# Patient Record
Sex: FEMALE | Race: WHITE | URBAN - METROPOLITAN AREA
[De-identification: names, ages, dates, MRNs, and addresses within clinical notes are randomized per-mention and may not be internally consistent; named-entity substitution may affect disease eponyms.]

---

## 2018-06-29 ENCOUNTER — OFFICE VISIT (OUTPATIENT)
Dept: URBAN - METROPOLITAN AREA CLINIC 23 | Facility: CLINIC | Age: 78
End: 2018-06-29
Payer: COMMERCIAL

## 2018-06-29 PROCEDURE — 99213 OFFICE O/P EST LOW 20 MIN: CPT | Performed by: OPHTHALMOLOGY

## 2018-06-29 PROCEDURE — 92134 CPTRZ OPH DX IMG PST SGM RTA: CPT | Performed by: OPHTHALMOLOGY

## 2018-06-29 ASSESSMENT — INTRAOCULAR PRESSURE
OS: 17
OD: 14

## 2018-06-29 NOTE — IMPRESSION/PLAN
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 OU. Condition: established, stable. Vision: vision affected. Hx of injections, last AV OD 09/04/2015, last AV OS 08/05/2016. Plan: Discussed diagnosis in detail with patient. No treatment is required at this time based on exam and OCT. Recommend observation for now. Will reassess condition in 10 wks. OCT OD is stable with no IRF or SRF and, OS shows stable Disciform Scar.  Recommend a refraction with Optometrist.

## 2018-08-02 ENCOUNTER — OFFICE VISIT (OUTPATIENT)
Dept: URBAN - METROPOLITAN AREA CLINIC 29 | Facility: CLINIC | Age: 78
End: 2018-08-02
Payer: COMMERCIAL

## 2018-08-02 DIAGNOSIS — H52.03 HYPERMETROPIA, BILATERAL: Primary | ICD-10-CM

## 2018-08-02 DIAGNOSIS — H35.443 BILATERAL AGE-RELATED RETICULAR DEGENERATION OF RETINAS: ICD-10-CM

## 2018-08-02 PROCEDURE — 92015 DETERMINE REFRACTIVE STATE: CPT | Performed by: OPTOMETRIST

## 2018-08-02 PROCEDURE — 92012 INTRM OPH EXAM EST PATIENT: CPT | Performed by: OPTOMETRIST

## 2018-08-02 ASSESSMENT — VISUAL ACUITY
OS: CF
OD: 20/25

## 2018-08-02 ASSESSMENT — INTRAOCULAR PRESSURE
OD: 18
OS: 19

## 2018-08-02 NOTE — IMPRESSION/PLAN
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 OU. Plan: Discussed diagnosis in detail with patient. Discussed treatment options with patient. Use of vitamins has shown to improve the effects of ARMD. Will continue to observe condition and or symptoms.

## 2018-09-07 ENCOUNTER — OFFICE VISIT (OUTPATIENT)
Dept: URBAN - METROPOLITAN AREA CLINIC 23 | Facility: CLINIC | Age: 78
End: 2018-09-07
Payer: COMMERCIAL

## 2018-09-07 DIAGNOSIS — H35.3233 EXUDATIVE AGE-REL MCLR DEGN, BILATERAL, WITH INACTIVE SCAR: Primary | ICD-10-CM

## 2018-09-07 PROCEDURE — 92134 CPTRZ OPH DX IMG PST SGM RTA: CPT | Performed by: OPHTHALMOLOGY

## 2018-09-07 PROCEDURE — 99213 OFFICE O/P EST LOW 20 MIN: CPT | Performed by: OPHTHALMOLOGY

## 2018-09-07 ASSESSMENT — INTRAOCULAR PRESSURE
OS: 13
OD: 13

## 2018-09-07 NOTE — IMPRESSION/PLAN
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 OU. Condition: established, stable. Vision: vision affected. Hx of injections, last AV OD 09/04/2015, last AV OS 08/05/2016. Plan: Discussed diagnosis in detail with patient. No treatment is required at this time based on exam and OCT. Recommend observation for now. Continue with AREDS 2 vitamins. Will reassess condition in 4 months. OCT OD is stable with no IRF or SRF and, OS shows stable Disciform Scar.

## 2019-01-03 ENCOUNTER — OFFICE VISIT (OUTPATIENT)
Dept: URBAN - METROPOLITAN AREA CLINIC 23 | Facility: CLINIC | Age: 79
End: 2019-01-03
Payer: MEDICARE

## 2019-01-03 DIAGNOSIS — H35.3211 EXDTVE AGE-REL MCLR DEGN, RIGHT EYE, WITH ACTV CHRDL NEOVAS: Primary | ICD-10-CM

## 2019-01-03 PROCEDURE — 99213 OFFICE O/P EST LOW 20 MIN: CPT | Performed by: OPHTHALMOLOGY

## 2019-01-03 PROCEDURE — 92134 CPTRZ OPH DX IMG PST SGM RTA: CPT | Performed by: OPHTHALMOLOGY

## 2019-01-03 ASSESSMENT — INTRAOCULAR PRESSURE
OD: 15
OS: 15

## 2019-01-03 NOTE — IMPRESSION/PLAN
Impression: Exdtve age-rel mclr degn, right eye, with actv chrdl neovas: H35.3211. OD. Condition: unstable. Vision: vision affected. last AV OD 09/04/2015 Plan: Discussed diagnosis in detail with patient. Discussed risks of progression with present condition. Based on findings recommend Intravitreal Injection Treatment to help reduce the fluid and prevent a further reduction in vision. Discussed the risks and benefits of tx. All questions answered. Patient elects to proceed with recommendation. OCT shows active fluid with scarring OD.

## 2019-01-03 NOTE — IMPRESSION/PLAN
Impression: Exudative age-rel mclr degn, left eye, with inactive scar: H00.2841. OS. Condition: stable. Vision: vision affected. Last AV OS 08/05/2016 Plan: Discussed diagnosis in detail with patient. No treatment is required at this time based on exam and OCT. Recommend observation for now. Will reassess condition in 6 wks. OCT shows stable Disciform Scar with no IRF or SRF OS.

## 2019-01-04 ENCOUNTER — PROCEDURE (OUTPATIENT)
Dept: URBAN - METROPOLITAN AREA CLINIC 23 | Facility: CLINIC | Age: 79
End: 2019-01-04
Payer: MEDICARE

## 2019-01-04 PROCEDURE — 67028 INJECTION EYE DRUG: CPT | Performed by: OPHTHALMOLOGY

## 2019-03-01 ENCOUNTER — OFFICE VISIT (OUTPATIENT)
Dept: URBAN - METROPOLITAN AREA CLINIC 23 | Facility: CLINIC | Age: 79
End: 2019-03-01
Payer: MEDICARE

## 2019-03-01 DIAGNOSIS — H25.13 AGE-RELATED NUCLEAR CATARACT, BILATERAL: ICD-10-CM

## 2019-03-01 PROCEDURE — 92134 CPTRZ OPH DX IMG PST SGM RTA: CPT | Performed by: OPHTHALMOLOGY

## 2019-03-01 PROCEDURE — 67028 INJECTION EYE DRUG: CPT | Performed by: OPHTHALMOLOGY

## 2019-03-01 PROCEDURE — 99213 OFFICE O/P EST LOW 20 MIN: CPT | Performed by: OPHTHALMOLOGY

## 2019-03-01 ASSESSMENT — INTRAOCULAR PRESSURE
OD: 16
OS: 16

## 2019-03-01 NOTE — IMPRESSION/PLAN
Impression: Exudative age-rel mclr degn, left eye, with inactive scar: I48.0411. OS. Condition: stable. Vision: vision affected. Last AV OS 08/05/2016 Plan: Discussed diagnosis in detail with patient. No treatment is required at this time based on exam and OCT. Recommend observation for now. Will reassess condition in 6 wks. OCT shows stable Disciform Scar with no IRF or SRF OS.

## 2019-03-01 NOTE — IMPRESSION/PLAN
Impression: Exdtve age-rel mclr degn, right eye, with actv chrdl neovas: H35.3211. OD. Condition: unstable but improving. Vision: vision affected. last AV OD 01/04/2019,  09/04/2015 Plan: Discussed diagnosis in detail with patient. Discussed risks of progression with present condition. Based on findings recommend to continue with Intravitreal Injection Treatment to help reduce the fluid and prevent a further reduction in vision. Discussed the risks and benefits of tx. All questions answered. An examination that was significantly and separately identifiable from the procedure was performed today. Patient elects to proceed with recommendation. OCT shows a decrease in fluid OD.

## 2019-04-12 ENCOUNTER — OFFICE VISIT (OUTPATIENT)
Dept: URBAN - METROPOLITAN AREA CLINIC 23 | Facility: CLINIC | Age: 79
End: 2019-04-12
Payer: MEDICARE

## 2019-04-12 PROCEDURE — 99213 OFFICE O/P EST LOW 20 MIN: CPT | Performed by: OPHTHALMOLOGY

## 2019-04-12 PROCEDURE — 92134 CPTRZ OPH DX IMG PST SGM RTA: CPT | Performed by: OPHTHALMOLOGY

## 2019-04-12 ASSESSMENT — INTRAOCULAR PRESSURE
OS: 18
OD: 19

## 2019-04-12 NOTE — IMPRESSION/PLAN
Impression: Exudative age-rel mclr degn, left eye, with inactive scar: C95.9919. OS. Condition: stable. Vision: vision affected. Last AV OS 08/05/2016 Plan: Discussed diagnosis in detail with patient. No treatment is required at this time based on exam and OCT. Recommend observation for now. Will reassess condition in 6 wks. OCT shows stable Disciform Scar with no IRF or SRF OS.

## 2019-04-12 NOTE — IMPRESSION/PLAN
Impression: Exdtve age-rel mclr degn, right eye, with actv chrdl neovas: H35.3211. OD. Condition:  improving. Vision: vision affected. last AV OD 03/01/2019. Plan:  Discussed diagnosis in detail with patient. No treatment is required at this time based on exam and OCT. Recommend observation for now. Will reassess condition in 6 wks. OCT shows a decrease in fluid OD.

## 2019-05-08 ENCOUNTER — OFFICE VISIT (OUTPATIENT)
Dept: URBAN - METROPOLITAN AREA CLINIC 23 | Facility: CLINIC | Age: 79
End: 2019-05-08
Payer: MEDICARE

## 2019-05-08 PROCEDURE — 92134 CPTRZ OPH DX IMG PST SGM RTA: CPT | Performed by: OPTOMETRIST

## 2019-05-08 PROCEDURE — 99213 OFFICE O/P EST LOW 20 MIN: CPT | Performed by: OPTOMETRIST

## 2019-05-08 ASSESSMENT — KERATOMETRY
OS: 44.13
OD: 43.75

## 2019-05-08 ASSESSMENT — INTRAOCULAR PRESSURE
OS: 18
OD: 20

## 2019-05-08 NOTE — IMPRESSION/PLAN
Impression: Exudative age-rel mclr degn, left eye, with inactive scar: H35.3223 OS. Plan: Stable. Continue to monitor with Dr. Jimy Mendoza.

## 2019-05-08 NOTE — IMPRESSION/PLAN
Impression: Exdtve age-rel mclr degn, right eye, with actv chrdl neovas: H35.3211. Plan: Discussed diagnosis in detail with patient. Discussed treatment options with patient. Reassured patient of current condition and treatment. Will continue to observe condition and or symptoms. Recommended f/u with Dr. Scott Ann in 2 days for injection OD.

## 2019-05-10 ENCOUNTER — OFFICE VISIT (OUTPATIENT)
Dept: URBAN - METROPOLITAN AREA CLINIC 23 | Facility: CLINIC | Age: 79
End: 2019-05-10
Payer: MEDICARE

## 2019-05-10 PROCEDURE — 67028 INJECTION EYE DRUG: CPT | Performed by: OPHTHALMOLOGY

## 2019-05-10 PROCEDURE — 99213 OFFICE O/P EST LOW 20 MIN: CPT | Performed by: OPHTHALMOLOGY

## 2019-05-10 PROCEDURE — 92134 CPTRZ OPH DX IMG PST SGM RTA: CPT | Performed by: OPHTHALMOLOGY

## 2019-05-10 ASSESSMENT — INTRAOCULAR PRESSURE
OS: 15
OD: 12

## 2019-05-10 NOTE — IMPRESSION/PLAN
Impression: Exudative age-rel mclr degn, left eye, with inactive scar: R15.8789. OS. Condition: stable. Vision: vision affected. Last AV OS 08/05/2016 Plan: Discussed diagnosis in detail with patient. No treatment is required at this time based on exam and OCT. Recommend observation for now. Will reassess condition in 4 -  6 wks. OCT shows stable Disciform Scar with no IRF or SRF OS.

## 2019-05-10 NOTE — IMPRESSION/PLAN
Impression: Exdtve age-rel mclr degn, right eye, with actv chrdl neovas: H35.3211. OD. Condition:  unstable. Vision: vision affected. last AV OD 03/01/2019. Plan: Discussed diagnosis in detail with patient. Discussed risks of progression. Based on today's exam, diagnostic studies and review of records, recommend to continue with YOAV tx to help reduce the fluid in order to prevent a further reduction in vision. An examination that was significantly and separately identifiable from the procedure was performed today. Discussed the risks and benefits of tx. Patient elects to proceed with recommendation. OCT shows active fluid OD.

## 2019-07-02 ENCOUNTER — OFFICE VISIT (OUTPATIENT)
Dept: URBAN - METROPOLITAN AREA CLINIC 23 | Facility: CLINIC | Age: 79
End: 2019-07-02
Payer: MEDICARE

## 2019-07-02 PROCEDURE — 92134 CPTRZ OPH DX IMG PST SGM RTA: CPT | Performed by: OPHTHALMOLOGY

## 2019-07-02 PROCEDURE — 92014 COMPRE OPH EXAM EST PT 1/>: CPT | Performed by: OPHTHALMOLOGY

## 2019-07-02 ASSESSMENT — INTRAOCULAR PRESSURE
OD: 12
OS: 11

## 2019-07-02 NOTE — IMPRESSION/PLAN
Impression: Exdtve age-rel mclr degn, right eye, with actv chrdl neovas: H35.3211. OD. Condition:  slightly improved. Vision: vision affected. last AV OD 5/10/2019, 03/01/2019. Plan: Discussed diagnosis in detail with patient. Discussed risks of progression. Based on today's exam, diagnostic studies and review of records, recommend to continue with YOAV tx RIGHT EYE ONLY to help reduce the fluid in order to prevent a further reduction in vision. Discussed the risks and benefits of tx. Patient elects to proceed with recommendation.  OCT shows slightly improved decreased fluid OD

## 2019-07-02 NOTE — IMPRESSION/PLAN
Impression: Exudative age-rel mclr degn, left eye, with inactive scar: V87.0028. OS. Condition: stable. Vision: vision affected. Last AV OS 08/05/2016 Plan: Discussed diagnosis in detail with patient. No treatment is required at this time based on exam and OCT. Recommend observation for now. Will reassess condition in 4 -  6 wks. OCT shows stable Disciform Scar with no IRF or SRF OS.

## 2019-07-02 NOTE — IMPRESSION/PLAN
Impression: Age-related nuclear cataract, bilateral: H25.13. OU. Condition: worsening. Vision: vision affected. Plan: Advised patient of condition. Discussed diagnosis in detail with patient.  Consult recommended with Cataract specialist

## 2019-07-03 ENCOUNTER — PROCEDURE (OUTPATIENT)
Dept: URBAN - METROPOLITAN AREA CLINIC 33 | Facility: CLINIC | Age: 79
End: 2019-07-03
Payer: MEDICARE

## 2019-07-03 PROCEDURE — 67028 INJECTION EYE DRUG: CPT | Performed by: OPHTHALMOLOGY

## 2019-07-17 ENCOUNTER — OFFICE VISIT (OUTPATIENT)
Dept: URBAN - METROPOLITAN AREA CLINIC 23 | Facility: CLINIC | Age: 79
End: 2019-07-17
Payer: MEDICARE

## 2019-07-17 DIAGNOSIS — H25.813 COMBINED FORMS OF AGE-RELATED CATARACT, BILATERAL: Primary | ICD-10-CM

## 2019-07-17 PROCEDURE — 99214 OFFICE O/P EST MOD 30 MIN: CPT | Performed by: OPHTHALMOLOGY

## 2019-07-17 RX ORDER — DUREZOL 0.5 MG/ML
0.05 % EMULSION OPHTHALMIC
Qty: 2.5 | Refills: 1 | Status: INACTIVE
Start: 2019-07-17 | End: 2019-11-08

## 2019-07-17 RX ORDER — OFLOXACIN 3 MG/ML
0.3 % SOLUTION/ DROPS OPHTHALMIC
Qty: 5 | Refills: 1 | Status: INACTIVE
Start: 2019-07-17 | End: 2019-11-08

## 2019-07-17 ASSESSMENT — KERATOMETRY
OD: 43.88
OS: 44.00

## 2019-07-17 ASSESSMENT — VISUAL ACUITY
OS: CF 1FT
OD: 20/40

## 2019-07-17 NOTE — IMPRESSION/PLAN
Impression: Exudative age-rel mclr degn, left eye, with inactive scar: Y43.9876. OS. Condition: stable. Vision: vision affected. 
Last AV OS 08/05/2016 Plan: F/U with Dr Jermaine Fields as scheduled

## 2019-08-02 ENCOUNTER — OFFICE VISIT (OUTPATIENT)
Dept: URBAN - METROPOLITAN AREA CLINIC 23 | Facility: CLINIC | Age: 79
End: 2019-08-02
Payer: MEDICARE

## 2019-08-02 PROCEDURE — 92134 CPTRZ OPH DX IMG PST SGM RTA: CPT | Performed by: OPHTHALMOLOGY

## 2019-08-02 PROCEDURE — 99213 OFFICE O/P EST LOW 20 MIN: CPT | Performed by: OPHTHALMOLOGY

## 2019-08-02 ASSESSMENT — INTRAOCULAR PRESSURE
OS: 12
OD: 12

## 2019-08-02 NOTE — IMPRESSION/PLAN
Impression: Exudative age-rel mclr degn, left eye, with inactive scar: B95.4788. OS. Condition: stable. Vision: vision affected. Last AV OS 08/05/2016 Plan: Discussed diagnosis in detail with patient. No treatment is required at this time based on exam and OCT. Recommend observation for now. Will reassess condition in 4 -  6 wks. OCT shows stable Disciform Scar with no IRF or SRF OS.

## 2019-08-02 NOTE — IMPRESSION/PLAN
Impression: Exdtve age-rel mclr degn, right eye, with actv chrdl neovas: H35.3211. OD. Condition:  stable. Vision: vision affected. last AV OD 07/03/2019, 5/10/2019, 03/01/2019. Plan: Discussed diagnosis in detail with patient. No treatment is required at this time based on exam and OCT. Recommend close observation for now. Will reassess condition in 4 - 6 wks. OCT shows no IRF or SRF - stable OD.

## 2019-08-12 ENCOUNTER — PRE-OPERATIVE VISIT (OUTPATIENT)
Dept: URBAN - METROPOLITAN AREA CLINIC 23 | Facility: CLINIC | Age: 79
End: 2019-08-12
Payer: MEDICARE

## 2019-08-12 PROCEDURE — 92136 OPHTHALMIC BIOMETRY: CPT | Performed by: OPHTHALMOLOGY

## 2019-08-12 ASSESSMENT — PACHYMETRY
OS: 2.83
OD: 2.97
OD: 22.24
OS: 21.91

## 2019-09-05 ENCOUNTER — SURGERY (OUTPATIENT)
Dept: URBAN - METROPOLITAN AREA SURGERY 11 | Facility: SURGERY | Age: 79
End: 2019-09-05
Payer: MEDICARE

## 2019-09-05 PROCEDURE — 66984 XCAPSL CTRC RMVL W/O ECP: CPT | Performed by: OPHTHALMOLOGY

## 2019-09-06 ENCOUNTER — POST-OPERATIVE VISIT (OUTPATIENT)
Dept: URBAN - METROPOLITAN AREA CLINIC 23 | Facility: CLINIC | Age: 79
End: 2019-09-06

## 2019-09-06 PROCEDURE — 99024 POSTOP FOLLOW-UP VISIT: CPT | Performed by: OPTOMETRIST

## 2019-09-06 ASSESSMENT — INTRAOCULAR PRESSURE
OD: 18
OS: 12

## 2019-09-12 ENCOUNTER — POST-OPERATIVE VISIT (OUTPATIENT)
Dept: URBAN - METROPOLITAN AREA CLINIC 23 | Facility: CLINIC | Age: 79
End: 2019-09-12

## 2019-09-12 DIAGNOSIS — Z09 ENCNTR FOR F/U EXAM AFT TRTMT FOR COND OTH THAN MALIG NEOPLM: Primary | ICD-10-CM

## 2019-09-12 PROCEDURE — 99024 POSTOP FOLLOW-UP VISIT: CPT | Performed by: OPTOMETRIST

## 2019-09-12 ASSESSMENT — VISUAL ACUITY: OD: 20/40+

## 2019-09-12 ASSESSMENT — INTRAOCULAR PRESSURE: OD: 17

## 2019-09-30 ENCOUNTER — OFFICE VISIT (OUTPATIENT)
Dept: URBAN - METROPOLITAN AREA CLINIC 23 | Facility: CLINIC | Age: 79
End: 2019-09-30
Payer: MEDICARE

## 2019-09-30 PROCEDURE — 99213 OFFICE O/P EST LOW 20 MIN: CPT | Performed by: OPHTHALMOLOGY

## 2019-09-30 PROCEDURE — 92134 CPTRZ OPH DX IMG PST SGM RTA: CPT | Performed by: OPHTHALMOLOGY

## 2019-09-30 ASSESSMENT — INTRAOCULAR PRESSURE
OS: 14
OD: 14

## 2019-09-30 NOTE — IMPRESSION/PLAN
Impression: Exdtve age-rel mclr degn, right eye, with actv chrdl neovas: H35.3211. OD. Condition:  unstable. Vision: vision affected. last AV OD 07/03/2019, 5/10/2019, 03/01/2019. Plan: Discussed diagnosis in detail with patient. Discussed risks of progression with present condition. Based on findings recommend Intravitreal Injection Treatment to help reduce the fluid and prevent a further reduction in vision. Discussed the risks and benefits of tx. All questions answered. Patient elects to proceed with recommendation. OCT shows increased fluid inferior to fovea center OD Discussed with patient would not recommend updating glasses rx until retina stable

## 2019-09-30 NOTE — IMPRESSION/PLAN
Impression: Exudative age-rel mclr degn, left eye, with inactive scar: E97.0320. OS. Condition: stable. Vision: vision affected. Last AV OS 08/05/2016 Plan: Discussed diagnosis in detail with patient. No treatment is required at this time based on exam and OCT. Recommend observation for now. Will reassess condition in 4 -  6 wks. OCT shows stable Disciform Scar with no IRF or SRF OS.

## 2019-10-11 ENCOUNTER — PROCEDURE (OUTPATIENT)
Dept: URBAN - METROPOLITAN AREA CLINIC 23 | Facility: CLINIC | Age: 79
End: 2019-10-11
Payer: MEDICARE

## 2019-10-11 PROCEDURE — 67028 INJECTION EYE DRUG: CPT | Performed by: OPHTHALMOLOGY

## 2019-11-08 ENCOUNTER — OFFICE VISIT (OUTPATIENT)
Dept: URBAN - METROPOLITAN AREA CLINIC 23 | Facility: CLINIC | Age: 79
End: 2019-11-08
Payer: MEDICARE

## 2019-11-08 PROCEDURE — 67028 INJECTION EYE DRUG: CPT | Performed by: OPHTHALMOLOGY

## 2019-11-08 PROCEDURE — 92134 CPTRZ OPH DX IMG PST SGM RTA: CPT | Performed by: OPHTHALMOLOGY

## 2019-11-08 PROCEDURE — 99213 OFFICE O/P EST LOW 20 MIN: CPT | Performed by: OPHTHALMOLOGY

## 2019-11-08 ASSESSMENT — INTRAOCULAR PRESSURE
OD: 13
OS: 14

## 2019-11-08 NOTE — IMPRESSION/PLAN
Impression: Age-related nuclear cataract, left eye: H25.12. OS. Condition: stable. Vision: vision affected. Plan: Discussed diagnosis in detail with patient. No treatment is required at this time. Continue eye care with Dr Elizabeth Martinez.

## 2019-11-08 NOTE — IMPRESSION/PLAN
Impression: Exdtve age-rel mclr degn, right eye, with actv chrdl neovas: H35.3211. OD. Condition:  improving. Vision: vision affected. last AV OD 10/11/2019, AV OD 07/03/2019, 5/10/2019, 03/01/2019. Plan: Discussed diagnosis in detail with patient. Discussed risks of progression with present condition. Based on findings recommend to continue with Intravitreal Injection Treatment to further reduce the fluid and prevent a further reduction in vision. Discussed the risks and benefits of tx. All questions answered. Patient elects to proceed with recommendation. OCT shows decrease in CMT - decrease in fluid OD.

## 2019-11-08 NOTE — IMPRESSION/PLAN
Impression: Exudative age-rel mclr degn, left eye, with inactive scar: R6.2633. OS. Condition: stable. Vision: vision affected. Last AV OS 08/05/2016 Plan: Discussed diagnosis in detail with patient. No treatment is required at this time based on exam and OCT. Recommend observation for now. Will reassess condition in 4 -  6 wks. OCT shows stable Disciform Scar with no IRF or SRF OS.

## 2019-12-06 ENCOUNTER — OFFICE VISIT (OUTPATIENT)
Dept: URBAN - METROPOLITAN AREA CLINIC 23 | Facility: CLINIC | Age: 79
End: 2019-12-06
Payer: MEDICARE

## 2019-12-06 PROCEDURE — 67028 INJECTION EYE DRUG: CPT | Performed by: OPHTHALMOLOGY

## 2019-12-06 PROCEDURE — 92134 CPTRZ OPH DX IMG PST SGM RTA: CPT | Performed by: OPHTHALMOLOGY

## 2019-12-06 PROCEDURE — 99213 OFFICE O/P EST LOW 20 MIN: CPT | Performed by: OPHTHALMOLOGY

## 2019-12-06 ASSESSMENT — INTRAOCULAR PRESSURE
OS: 15
OD: 15

## 2019-12-06 NOTE — IMPRESSION/PLAN
Impression: Age-related nuclear cataract, left eye: H25.12. OS. Condition: stable. Vision: vision affected. Plan: Discussed diagnosis in detail with patient. No treatment is required at this time. Continue eye care with Dr Indy Samuels.

## 2019-12-06 NOTE — IMPRESSION/PLAN
Impression: Exudative age-rel mclr degn, left eye, with inactive scar: N27.7417. OS. Condition: stable. Vision: vision affected. Last AV OS 08/05/2016 Plan: Discussed diagnosis in detail with patient. No treatment is required at this time based on exam and OCT. Recommend observation for now. Will reassess condition in 4 -  6 wks. OCT shows stable Disciform Scar with no IRF or SRF OS.

## 2019-12-06 NOTE — IMPRESSION/PLAN
Impression: Exdtve age-rel mclr degn, right eye, with actv chrdl neovas: H35.3211. OD. Condition:  improving. Vision: vision affected. last AV OD 11/8/2019, AV OD 10/11/2019, AV OD 07/03/2019, 5/10/2019, 03/01/2019. Plan: Discussed diagnosis in detail with patient. Discussed risks of progression with present condition. Based on findings recommend to continue with Intravitreal Injection Treatment to further reduce the fluid and prevent a further reduction in vision. Discussed the risks and benefits of tx. All questions answered. Patient elects to proceed with recommendation. OCT shows decrease in CMT - decrease in fluid OD.

## 2020-01-17 ENCOUNTER — OFFICE VISIT (OUTPATIENT)
Dept: URBAN - METROPOLITAN AREA CLINIC 23 | Facility: CLINIC | Age: 80
End: 2020-01-17
Payer: MEDICARE

## 2020-01-17 PROCEDURE — 67028 INJECTION EYE DRUG: CPT | Performed by: OPHTHALMOLOGY

## 2020-01-17 PROCEDURE — 99213 OFFICE O/P EST LOW 20 MIN: CPT | Performed by: OPHTHALMOLOGY

## 2020-01-17 PROCEDURE — 92134 CPTRZ OPH DX IMG PST SGM RTA: CPT | Performed by: OPHTHALMOLOGY

## 2020-01-17 ASSESSMENT — INTRAOCULAR PRESSURE
OS: 16
OD: 14

## 2020-01-17 NOTE — IMPRESSION/PLAN
Impression: Exdtve age-rel mclr degn, right eye, with actv chrdl neovas: H35.3211. OD. Condition:  improving. Vision: vision improving. last AV OD 12/06/2019, AV OD 11/8/2019, AV OD 10/11/2019, AV OD 07/03/2019, 5/10/2019, 03/01/2019. Plan: Discussed diagnosis in detail with patient. Discussed risks of progression with present condition. Based on findings recommend to continue with Intravitreal Injection Treatment to further reduce the fluid and prevent a further reduction in vision. Discussed the risks and benefits of tx. All questions answered. Patient elects to proceed with recommendation. OCT shows decrease in CMT - decrease in fluid OD.

## 2020-01-17 NOTE — IMPRESSION/PLAN
Impression: Age-related nuclear cataract, left eye: H25.12. OS. Condition: stable. Vision: vision affected. Plan: Discussed diagnosis in detail with patient. No treatment is required at this time. Continue eye care with Dr Lillie Ramos.

## 2020-01-17 NOTE — IMPRESSION/PLAN
Impression: Exudative age-rel mclr degn, left eye, with inactive scar: K53.2044. OS. Condition: stable. Vision: vision affected. Last AV OS 08/05/2016 Plan: Discussed diagnosis in detail with patient. No treatment is required at this time based on exam and OCT. Recommend observation for now. Will reassess condition in 4 -  6 wks. OCT shows stable Disciform Scar with no IRF or SRF OS.

## 2020-02-28 ENCOUNTER — OFFICE VISIT (OUTPATIENT)
Dept: URBAN - METROPOLITAN AREA CLINIC 23 | Facility: CLINIC | Age: 80
End: 2020-02-28
Payer: MEDICARE

## 2020-02-28 DIAGNOSIS — H25.12 AGE-RELATED NUCLEAR CATARACT, LEFT EYE: ICD-10-CM

## 2020-02-28 PROCEDURE — 92134 CPTRZ OPH DX IMG PST SGM RTA: CPT | Performed by: OPHTHALMOLOGY

## 2020-02-28 PROCEDURE — 67028 INJECTION EYE DRUG: CPT | Performed by: OPHTHALMOLOGY

## 2020-02-28 PROCEDURE — 99213 OFFICE O/P EST LOW 20 MIN: CPT | Performed by: OPHTHALMOLOGY

## 2020-02-28 ASSESSMENT — INTRAOCULAR PRESSURE
OD: 15
OS: 15

## 2020-02-28 NOTE — IMPRESSION/PLAN
Impression: Exudative age-rel mclr degn, left eye, with inactive scar: T18.6617. OS. Condition: stable. Vision: vision affected. Last AV OS 08/05/2016 Plan: Discussed diagnosis in detail with patient. No treatment is required at this time based on exam and OCT. Recommend observation for now. Will reassess condition in 4 -  6 wks. OCT shows stable Disciform Scar with no IRF or SRF OS.

## 2020-02-28 NOTE — IMPRESSION/PLAN
Impression: Exdtve age-rel mclr colten, right eye, with actv chrdl neovas: H35.3211. OD. Condition:  improving. Vision: vision improving. last AV OD 01/17/2020, 12/06/2019, AV OD 11/8/2019, AV OD 10/11/2019. .. Plan: Discussed diagnosis in detail with patient. Discussed risks of progression with present condition. Patient states her vision continues to decrease even post YOAV tx, would like to know what can be done to help her vision, why is vision decreasing with YOAV tx. Discussed ARMD in great length and progression of condition. Patient is concerned about loosing her site and is not understanding why her vision continues to worsen. Based on findings recommend to continue with Intravitreal Injection Treatment with AVASTIN and KENALOG  to further reduce the fluid and prevent a further reduction in vision. Discussed the risks and benefits of tx. All questions answered. Patient elects to proceed with recommendation although unhappy with current treatment. OCT shows decrease in CMT - decrease in fluid OD.

## 2020-02-28 NOTE — IMPRESSION/PLAN
Impression: Age-related nuclear cataract, left eye: H25.12. OS. Condition: stable. Vision: vision affected. Plan: Discussed diagnosis in detail with patient. No treatment is required at this time. Continue eye care with Dr Catalino Tafoya.

## 2020-03-06 ENCOUNTER — OFFICE VISIT (OUTPATIENT)
Dept: URBAN - METROPOLITAN AREA CLINIC 23 | Facility: CLINIC | Age: 80
End: 2020-03-06
Payer: MEDICARE

## 2020-03-06 PROCEDURE — 99212 OFFICE O/P EST SF 10 MIN: CPT | Performed by: OPTOMETRIST

## 2020-03-06 ASSESSMENT — INTRAOCULAR PRESSURE
OS: 13
OD: 16

## 2020-03-06 NOTE — IMPRESSION/PLAN
Impression: Exudative age-rel mclr degn, left eye, with inactive scar: Q04.8835. Plan: Discussed findings. Recommended going to [facility] for low vision assistance. Pt. to f/u with Dr. Kike Marroquin as scheduled in April.

## 2020-03-06 NOTE — IMPRESSION/PLAN
Impression: Exudative age-rel mclr degn, left eye, with inactive scar: P14.3536. Plan: Discussed findings. Pt. to f/u with Dr. Alaina Pablo as scheduled in April.

## 2020-03-06 NOTE — IMPRESSION/PLAN
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 Plan: Return in one year with Dr. Josh Kyle for complete examination.

## 2020-06-05 ENCOUNTER — OFFICE VISIT (OUTPATIENT)
Dept: URBAN - METROPOLITAN AREA CLINIC 23 | Facility: CLINIC | Age: 80
End: 2020-06-05
Payer: MEDICARE

## 2020-06-05 PROCEDURE — 99213 OFFICE O/P EST LOW 20 MIN: CPT | Performed by: OPHTHALMOLOGY

## 2020-06-05 PROCEDURE — 67028 INJECTION EYE DRUG: CPT | Performed by: OPHTHALMOLOGY

## 2020-06-05 ASSESSMENT — INTRAOCULAR PRESSURE
OS: 14
OD: 13

## 2020-06-05 NOTE — IMPRESSION/PLAN
Impression: Exudative age-rel mclr degn, left eye, with inactive scar: I31.3192. OS. Condition: stable. Vision: vision affected. Last AV OS 08/05/2016 Plan: Discussed diagnosis in detail with patient. No treatment is required at this time based on exam and OCT. Recommend observation for now. Will reassess condition in 4 -  6 wks. OCT and Optos shows stable Disciform Scar with no IRF or SRF OS. Patient states her vision is decreasing and would like to know if she can see Dr. Wan Mcintosh. Patient can schedule a LVE w/Dr Wan Mcintosh.

## 2020-06-05 NOTE — IMPRESSION/PLAN
Impression: Exdtve age-rel mclr degn, right eye, with actv chrdl neovas: H35.3211. OD. Condition:  unstable. Vision: vision affected
last AV / ALIREZA OD 02/28/2020, AV OD 01/17/2020, 12/06/2019, AV OD 11/8/2019, AV OD 10/11/2019. .. Plan: Discussed diagnosis in detail with patient. Discussed risks of progression. Based on today's exam, diagnostic studies and review of records, recommend to continue with YOAV tx to help reduce the fluid in order to prevent a further reduction in vision. An examination that was significantly and separately identifiable from the procedure was performed today. Discussed the risks and benefits of tx. Patient elects to proceed with recommendation. OCT shows an increase in CMT and Optos shows fluid OD. Patient states her vision is decreasing and would like to know if she can see Dr. Richar Barrientos. Patient can schedule a LVE w/Dr Richar Barrientos.

## 2020-06-12 ENCOUNTER — OFFICE VISIT (OUTPATIENT)
Dept: URBAN - METROPOLITAN AREA CLINIC 32 | Facility: CLINIC | Age: 80
End: 2020-06-12
Payer: MEDICARE

## 2020-06-12 PROCEDURE — 99204 OFFICE O/P NEW MOD 45 MIN: CPT | Performed by: OPTOMETRIST

## 2020-06-12 PROCEDURE — 99214 OFFICE O/P EST MOD 30 MIN: CPT | Performed by: OPTOMETRIST

## 2020-06-12 ASSESSMENT — INTRAOCULAR PRESSURE
OD: 12
OS: 13

## 2020-06-12 ASSESSMENT — VISUAL ACUITY
OD: 20/150
OS: CF 2FT

## 2020-06-12 NOTE — IMPRESSION/PLAN
Impression: Exudative age-rel mclr degn, left eye, with inactive scar: S63.6986. OS. Condition: stable. Vision: vision affected. Last AV OS 08/05/2016 Plan: Discussed diagnosis in detail with patient. No treatment is required at this time based on exam and OCT. Recommend observation for now. Will reassess condition in 4 -  6 wks.  OCT and Optos shows stable Disciform Scar with no IRF or SRF OS. discussed DEVICES  and ADLs

## 2020-08-28 ENCOUNTER — OFFICE VISIT (OUTPATIENT)
Dept: URBAN - METROPOLITAN AREA CLINIC 23 | Facility: CLINIC | Age: 80
End: 2020-08-28
Payer: MEDICARE

## 2020-08-28 PROCEDURE — 92134 CPTRZ OPH DX IMG PST SGM RTA: CPT | Performed by: OPHTHALMOLOGY

## 2020-08-28 PROCEDURE — 67028 INJECTION EYE DRUG: CPT | Performed by: OPHTHALMOLOGY

## 2020-08-28 PROCEDURE — 99213 OFFICE O/P EST LOW 20 MIN: CPT | Performed by: OPHTHALMOLOGY

## 2020-08-28 ASSESSMENT — INTRAOCULAR PRESSURE
OS: 12
OD: 12

## 2020-08-28 NOTE — IMPRESSION/PLAN
Impression: Exudative age-rel mclr degn, left eye, with inactive scar: K63.5121. OS. Condition: stable. Vision: vision affected. Last AV OS 08/05/2016 Plan: Due to Coronavirus COVID-19 pandemic and National Emergency, deferred Slit Lamp examination. Findings are based on OCT and Optos. OCT and Optos shows no IRF or SRF OS. No treatment needed at this time based on diagnostic tests.  Recommend a retina follow - up in 1  mo

## 2020-08-28 NOTE — IMPRESSION/PLAN
Impression: Exdtve age-rel mclr degn, right eye, with actv chrdl neovas: H35.3211. OD. Condition:  unstable. Vision: vision affected
last AV / ALIREZA OD 02/28/2020, AV OD 01/17/2020, 12/06/2019, AV OD 11/8/2019, AV OD 10/11/2019, 6/5/20 Plan: Due to Coronavirus COVID-19 pandemic and National Emergency, deferred Slit Lamp examination. Findings are based on OCT and Optos. OCT OD shows and Optos OD shows Increased Heme OD Based on findings recommend to continue restart with Intravitreal Injection Treatment to help reduce the fluid and prevent a further reduction in vision. Discussed the risks and benefits of tx. All questions answered. Patient elects to proceed with recommendation.

## 2020-09-25 ENCOUNTER — OFFICE VISIT (OUTPATIENT)
Dept: URBAN - METROPOLITAN AREA CLINIC 23 | Facility: CLINIC | Age: 80
End: 2020-09-25
Payer: MEDICARE

## 2020-09-25 PROCEDURE — 92134 CPTRZ OPH DX IMG PST SGM RTA: CPT | Performed by: OPHTHALMOLOGY

## 2020-09-25 PROCEDURE — 99213 OFFICE O/P EST LOW 20 MIN: CPT | Performed by: OPHTHALMOLOGY

## 2020-09-25 ASSESSMENT — INTRAOCULAR PRESSURE
OD: 13
OS: 13

## 2020-09-25 NOTE — IMPRESSION/PLAN
Impression: Exudative age-rel mclr degn, left eye, with inactive scar: E86.1085. OS. Condition: stable. Vision: vision affected. Last AV OS 08/05/2016 Plan: Due to Coronavirus COVID-19 pandemic and National Emergency, deferred Slit Lamp examination. Findings are based on OCT and Optos. OCT and Optos shows no IRF or SRF OS. No treatment needed at this time based on diagnostic tests. Recommend a retina follow - up in 6 wks.

## 2020-09-25 NOTE — IMPRESSION/PLAN
Impression: Exdtve age-rel mclr degn, right eye, with actv chrdl neovas: H35.3211. OD. Condition:  unstable. Vision: vision affected
last AV OD 08/28/2020, AV / Dennis Bleak OD 02/28/2020, AV OD 01/17/2020, 12/06/2019, AV OD 11/8/2019, AV OD 10/11/2019 . .. Plan: Due to Coronavirus COVID-19 pandemic and National Emergency, deferred Slit Lamp examination. Findings are based on OCT and Optos. OCT and Optos OD shows active fluid. Based on findings discussed treatment options with Kenalog or combination AV/ ALIREZA OD and patient stated that the Kenalog made her vision blurry for about 2 wks therefore she defers treatment with Kenalog. Recommend Intravitreal Injection treatment with AVASTIN to help reduce the fluid and prevent a further reduction in vision. Discussed the risks and benefits of tx. All questions answered. Patient elects to proceed with recommendation. Patient did not want to return in the afternoon for her treatment, will schedule AV OD 10/02/2020.

## 2020-10-02 ENCOUNTER — PROCEDURE (OUTPATIENT)
Dept: URBAN - METROPOLITAN AREA CLINIC 23 | Facility: CLINIC | Age: 80
End: 2020-10-02
Payer: MEDICARE

## 2020-10-02 PROCEDURE — 67028 INJECTION EYE DRUG: CPT | Performed by: OPHTHALMOLOGY

## 2020-11-20 ENCOUNTER — OFFICE VISIT (OUTPATIENT)
Dept: URBAN - METROPOLITAN AREA CLINIC 23 | Facility: CLINIC | Age: 80
End: 2020-11-20
Payer: MEDICARE

## 2020-11-20 PROCEDURE — 99213 OFFICE O/P EST LOW 20 MIN: CPT | Performed by: OPHTHALMOLOGY

## 2020-11-20 PROCEDURE — 67028 INJECTION EYE DRUG: CPT | Performed by: OPHTHALMOLOGY

## 2020-11-20 PROCEDURE — 67515 INJECT/TREAT EYE SOCKET: CPT | Performed by: OPHTHALMOLOGY

## 2020-11-20 PROCEDURE — 92134 CPTRZ OPH DX IMG PST SGM RTA: CPT | Performed by: OPHTHALMOLOGY

## 2020-11-20 ASSESSMENT — INTRAOCULAR PRESSURE
OS: 14
OD: 14

## 2020-11-20 NOTE — IMPRESSION/PLAN
Impression: Exdtve age-rel mclr degn, right eye, with actv chrdl neovas: H35.3211. OD. Condition:  unstable. Vision: vision affected
last AV OD 10/02/2020, AV OD 08/28/2020, AV / ALIREZA OD 02/28/2020 . .. Plan: Due to Coronavirus COVID-19 pandemic and National Emergency, deferred Slit Lamp examination. Findings are based on OCT and Optos. OCT and Optos OD shows persistent fluid OD. Based on findings discussed treatment options with Kenalog or combination AV/ ALIREZA OD and patient stated that the Kenalog made her vision blurry for about 2 wks therefore she defers treatment with Kenalog. Recommend Intravitreal Injection treatment with AVASTIN and Subtenon injection with Kenalog RIGHT EYE (long discussion about combination treatment), should not make her vision blurry. Treatment is recommended to help reduce the fluid and prevent a further reduction in vision. Discussed the risks and benefits of tx. All questions answered. Patient elects to proceed with recommendation.

## 2021-01-15 ENCOUNTER — OFFICE VISIT (OUTPATIENT)
Dept: URBAN - METROPOLITAN AREA CLINIC 23 | Facility: CLINIC | Age: 81
End: 2021-01-15
Payer: MEDICARE

## 2021-01-15 PROCEDURE — 92134 CPTRZ OPH DX IMG PST SGM RTA: CPT | Performed by: OPHTHALMOLOGY

## 2021-01-15 PROCEDURE — 67028 INJECTION EYE DRUG: CPT | Performed by: OPHTHALMOLOGY

## 2021-01-15 ASSESSMENT — INTRAOCULAR PRESSURE
OS: 17
OD: 17

## 2021-01-15 NOTE — IMPRESSION/PLAN
Impression: Exudative age-rel mclr degn, left eye, with inactive scar: M43.8748. OS. Condition: stable. Vision: vision affected. Last AV OS 08/05/2016 Plan: Due to Coronavirus COVID-19 pandemic and National Emergency, deferred Slit Lamp examination. Findings are based on OCT and Optos. OCT and Optos shows no IRF or SRF OS. No treatment needed at this time based on diagnostic tests. Recommend a retina follow - up in 4 - 6 wks.

## 2021-01-15 NOTE — IMPRESSION/PLAN
Impression: Exdtve age-rel mclr degn, right eye, with actv chrdl neovas: H35.3211. OD. Condition:  unstable. Vision: vision affected
last AV OD 11/20/2020, 10/02/2020, AV OD 08/28/2020, AV / ALIREZA OD 02/28/2020 . .. Plan: Due to Coronavirus COVID-19 pandemic and National Emergency, deferred Slit Lamp examination. Findings are based on OCT and Optos. OCT OD shows fluid and Optos OD shows active heme. Based on findings recommend to continue with YOAV tx with AVASTIN in the RIGHT EYE to help reduce the activity and prevent a further reduction in vision. Discussed the risks and benefits of tx. All questions answered. Patient elects to proceed with recommendation.

## 2021-02-26 ENCOUNTER — OFFICE VISIT (OUTPATIENT)
Dept: URBAN - METROPOLITAN AREA CLINIC 23 | Facility: CLINIC | Age: 81
End: 2021-02-26
Payer: MEDICARE

## 2021-02-26 PROCEDURE — 92134 CPTRZ OPH DX IMG PST SGM RTA: CPT | Performed by: OPHTHALMOLOGY

## 2021-02-26 PROCEDURE — 67028 INJECTION EYE DRUG: CPT | Performed by: OPHTHALMOLOGY

## 2021-02-26 ASSESSMENT — INTRAOCULAR PRESSURE
OD: 17
OS: 13

## 2021-02-26 NOTE — IMPRESSION/PLAN
Impression: Exdtve age-rel mclr degn, right eye, with actv chrdl neovas: H35.3211. OD. Condition:  unstable. Vision: vision affected s/p AV OD 1/15/2021, AV OD 11/20/2020, 10/02/2020, AV OD 08/28/2020, AV / ALIREZA OD 02/28/2020 . .. Plan: Due to Coronavirus COVID-19 pandemic and National Emergency, deferred Slit Lamp examination. Findings are based on OCT and Optos. OCT OD shows extensive cystic changes and Optos OD shows extensive leakage with decreased heme. Based on findings recommend to continue with YOAV tx with AVASTIN in the RIGHT EYE to help reduce the activity and prevent a further reduction in vision. Discussed the risks and benefits of tx. All questions answered. Patient elects to proceed with recommendation. Had a long discussion with patient and her  in regards to the severity of her condition and reasoning to continue treatment. The patient reports significant anxiety and per the patient's , she has become very depressed in regards to her vision loss. Recommended to the patient and her  that she should see a Psychologist to address the depression and anxiety she is having in regards to her vision.

## 2021-02-26 NOTE — IMPRESSION/PLAN
Impression: Exudative age-rel mclr degn, left eye, with inactive scar: D71.9654. OS. Condition: stable. Vision: vision affected. Poor prognosis. Last AV OS 08/05/2016 Plan: Due to Coronavirus COVID-19 pandemic and National Emergency, deferred Slit Lamp examination. Findings are based on OCT and Optos. OCT and Optos shows the macula is stable w/ inactive scar OS. No treatment needed at this time based on diagnostic tests. Recommend a retina follow - up in 4 - 6 wks.

## 2021-04-09 ENCOUNTER — OFFICE VISIT (OUTPATIENT)
Dept: URBAN - METROPOLITAN AREA CLINIC 23 | Facility: CLINIC | Age: 81
End: 2021-04-09
Payer: MEDICARE

## 2021-04-09 PROCEDURE — 99213 OFFICE O/P EST LOW 20 MIN: CPT | Performed by: OPHTHALMOLOGY

## 2021-04-09 PROCEDURE — 92134 CPTRZ OPH DX IMG PST SGM RTA: CPT | Performed by: OPHTHALMOLOGY

## 2021-04-09 ASSESSMENT — INTRAOCULAR PRESSURE
OD: 17
OS: 12

## 2021-04-09 NOTE — IMPRESSION/PLAN
Impression: Exdtve age-rel mclr degn, right eye, with actv chrdl neovas: H35.3211. OD. Condition: unstable poor prognosis. Vision: vision affected s/p AV OD 2/26/2021, AV OD 1/15/2021, AV OD 11/20/2020, 10/02/2020, AV OD 08/28/2020, AV / ALIREZA OD 02/28/2020 . .. Plan: Due to Coronavirus COVID-19 pandemic and National Emergency, deferred Slit Lamp examination. Findings are based on OCT and Optos. OCT OD shows extensive cystic changes and Optos OD shows decrease leakage with decreased heme. Discussed findings with patients. Patient states she feels her vision continues to get worse with each treatment and that the vision in her peripheral vision on the left eye has become more blurry vision her last visit. She states she is becoming very discouraged with her vision loss and doesn't understand what the point of continuing the injection therapy is if it is not improving her vision. Discussed with patient that in the past a surgery was available to remove the area of scar tissue for some visual improvement. However the success rate was very low and the risk was high for retinal detachments and other complications that could lead complete vision loss and loss of the peripheral vision she has. There are not any doctor performing this surgery any longer as YOAV therapy is the preferred treatment plan. Based on the findings from today's diagnostic studies recommend observation, will reassess the in 4-6 weeks. Patient states she has not followed up a Psychologist to address her depression and anxiety in regards to her vision.  Provided patient with information to [employer], and macular degeneration support websites from [address] [place] [address].

## 2021-04-09 NOTE — IMPRESSION/PLAN
Impression: Exudative age-rel mclr degn, left eye, with inactive scar: A29.2478. OS. Condition: stable. Vision: vision affected. Poor prognosis. Last AV OS 08/05/2016 Plan: Due to Coronavirus COVID-19 pandemic and National Emergency, deferred Slit Lamp examination. Findings are based on OCT and Optos. OCT and Optos shows the macula is stable w/ inactive scar OS. No treatment needed at this time based on diagnostic tests. Recommend a retina follow - up in 4 - 6 wks.

## 2022-03-23 ENCOUNTER — OFFICE VISIT (OUTPATIENT)
Dept: URBAN - METROPOLITAN AREA CLINIC 24 | Facility: CLINIC | Age: 82
End: 2022-03-23
Payer: MEDICARE

## 2022-03-23 DIAGNOSIS — H26.491 OTHER SECONDARY CATARACT, RIGHT EYE: ICD-10-CM

## 2022-03-23 DIAGNOSIS — H25.812 COMBINED FORMS OF AGE-RELATED CATARACT, LEFT EYE: ICD-10-CM

## 2022-03-23 DIAGNOSIS — H04.123 TEAR FILM INSUFFICIENCY OF BILATERAL LACRIMAL GLANDS: ICD-10-CM

## 2022-03-23 DIAGNOSIS — H35.3223 EXUDATIVE AGE-REL MCLR DEGN, LEFT EYE, WITH INACTIVE SCAR: ICD-10-CM

## 2022-03-23 PROCEDURE — 92250 FUNDUS PHOTOGRAPHY W/I&R: CPT | Performed by: STUDENT IN AN ORGANIZED HEALTH CARE EDUCATION/TRAINING PROGRAM

## 2022-03-23 PROCEDURE — 92134 CPTRZ OPH DX IMG PST SGM RTA: CPT | Performed by: STUDENT IN AN ORGANIZED HEALTH CARE EDUCATION/TRAINING PROGRAM

## 2022-03-23 PROCEDURE — 99204 OFFICE O/P NEW MOD 45 MIN: CPT | Performed by: STUDENT IN AN ORGANIZED HEALTH CARE EDUCATION/TRAINING PROGRAM

## 2022-03-23 ASSESSMENT — INTRAOCULAR PRESSURE
OS: 14
OD: 14

## 2022-03-23 ASSESSMENT — KERATOMETRY
OS: 44.35
OD: 43.98

## 2022-03-23 ASSESSMENT — VISUAL ACUITY: OD: 20/125

## 2022-03-23 NOTE — IMPRESSION/PLAN
Impression: Exudative age-rel mclr degn, left eye, with inactive scar: Q58.4941. OS. Condition: stable. Vision: vision affected. Poor prognosis. Last AV OS 08/05/2016 Plan: OCT and Optos shows the macula is stable w/ inactive scar OS. No treatment needed at this time based on diagnostic tests.

## 2022-03-23 NOTE — IMPRESSION/PLAN
Impression: Tear film insufficiency of bilateral lacrimal glands: H04.123. Plan: Dry eyes account for the patient's complaints. There is no evidence of permanent changes to the cornea. Explained condition does not have a cure, is a chronic condition and will need consistent artificial tears use for maintenance.  
Start art tears qid prn OU

## 2022-03-23 NOTE — IMPRESSION/PLAN
Impression: Combined forms of age-related cataract, left eye: H25.812. Plan: Cataracts contribute to the patient's complaints but ADL not significantly affected. No treatment currently recommended. The patient will monitor vision changes and contact us with any decrease in vision.

## 2022-03-23 NOTE — IMPRESSION/PLAN
Impression: Exudative age-related macular degeneration, right eye, with active choroidal neovascularization: H35.3211. Plan: Lost to care x 4/9/2021. Pt reports stable vision. Unk onset of new vs extension of CNVM along inf edge with SR heme inf to mac extending along IT arcades. Extensive discussion regarding importance of f/u care

## 2022-04-26 ENCOUNTER — OFFICE VISIT (OUTPATIENT)
Dept: URBAN - METROPOLITAN AREA CLINIC 24 | Facility: CLINIC | Age: 82
End: 2022-04-26
Payer: MEDICARE

## 2022-04-26 DIAGNOSIS — H35.3233 EXUDATIVE MACULAR DEGENERATION, INACTIVE SCAR, BILATERAL: Primary | ICD-10-CM

## 2022-04-26 DIAGNOSIS — H35.3211 EXUDATIVE AGE-RELATED MACULAR DEGENERATION, RIGHT EYE, WITH ACTIVE CHOROIDAL NEOVASCULARIZATION: ICD-10-CM

## 2022-04-26 PROCEDURE — 92134 CPTRZ OPH DX IMG PST SGM RTA: CPT | Performed by: OPHTHALMOLOGY

## 2022-04-26 PROCEDURE — 92004 COMPRE OPH EXAM NEW PT 1/>: CPT | Performed by: OPHTHALMOLOGY

## 2022-04-26 ASSESSMENT — INTRAOCULAR PRESSURE
OD: 8
OS: 10

## 2022-04-26 NOTE — IMPRESSION/PLAN
Impression: Exudative macular degeneration, inactive scar, bilateral: R28.5337. 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 CNVM precautions emphasized. Amsler grid given. Follow-up in 3 months for exam, sooner PRN Refer to low vision

## 2022-10-03 ENCOUNTER — OFFICE VISIT (OUTPATIENT)
Dept: URBAN - METROPOLITAN AREA CLINIC 24 | Facility: CLINIC | Age: 82
End: 2022-10-03
Payer: MEDICARE

## 2022-10-03 DIAGNOSIS — H35.3233 EXUDATIVE MACULAR DEGENERATION, INACTIVE SCAR, BILATERAL: Primary | ICD-10-CM

## 2022-10-03 PROCEDURE — 92134 CPTRZ OPH DX IMG PST SGM RTA: CPT | Performed by: OPHTHALMOLOGY

## 2022-10-03 PROCEDURE — 92014 COMPRE OPH EXAM EST PT 1/>: CPT | Performed by: OPHTHALMOLOGY

## 2022-10-03 ASSESSMENT — INTRAOCULAR PRESSURE
OS: 11
OD: 14

## 2022-10-03 NOTE — IMPRESSION/PLAN
Impression: Exudative macular degeneration, inactive scar, bilateral: R26.4362. 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 CNVM precautions emphasized. Amsler grid given. Follow-up in 3 months for exam, sooner PRN Refer to low vision

## 2023-02-06 ENCOUNTER — OFFICE VISIT (OUTPATIENT)
Dept: URBAN - METROPOLITAN AREA CLINIC 24 | Facility: CLINIC | Age: 83
End: 2023-02-06
Payer: MEDICARE

## 2023-02-06 DIAGNOSIS — H35.3233 EXUDATIVE AGE-RELATED MACULAR DEGENERATION, BILATERAL, WITH INACTIVE SCAR: Primary | ICD-10-CM

## 2023-02-06 PROCEDURE — 92014 COMPRE OPH EXAM EST PT 1/>: CPT | Performed by: OPHTHALMOLOGY

## 2023-02-06 PROCEDURE — 92134 CPTRZ OPH DX IMG PST SGM RTA: CPT | Performed by: OPHTHALMOLOGY

## 2023-02-06 ASSESSMENT — INTRAOCULAR PRESSURE
OS: 14
OD: 10

## 2023-02-06 NOTE — IMPRESSION/PLAN
Impression: Exudative macular degeneration, inactive scar, bilateral: Z69.0277. 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 CNVM precautions emphasized. Amsler grid given. Follow-up in 3 months for exam, sooner PRN Refer to low vision at [facility]

## 2023-05-23 ENCOUNTER — OFFICE VISIT (OUTPATIENT)
Dept: URBAN - METROPOLITAN AREA CLINIC 10 | Facility: CLINIC | Age: 83
End: 2023-05-23
Payer: MEDICARE

## 2023-05-23 DIAGNOSIS — H35.3211 EXUDATIVE AGE-RELATED MACULAR DEGENERATION, RIGHT EYE, WITH ACTIVE CHOROIDAL NEOVASCULARIZATION: ICD-10-CM

## 2023-05-23 DIAGNOSIS — H35.3233 EXUDATIVE MACULAR DEGENERATION, INACTIVE SCAR, BILATERAL: Primary | ICD-10-CM

## 2023-05-23 PROCEDURE — 99214 OFFICE O/P EST MOD 30 MIN: CPT | Performed by: OPHTHALMOLOGY

## 2023-05-23 PROCEDURE — 67028 INJECTION EYE DRUG: CPT | Performed by: OPHTHALMOLOGY

## 2023-05-23 PROCEDURE — 92134 CPTRZ OPH DX IMG PST SGM RTA: CPT | Performed by: OPHTHALMOLOGY

## 2023-05-23 ASSESSMENT — INTRAOCULAR PRESSURE
OS: 8
OD: 6

## 2023-05-23 NOTE — IMPRESSION/PLAN
Impression: WET AMD inactive scar OU E50.4870. 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 CNVM precautions emphasized.   See prior care Santos Ramon and [address] [place] [address]

## 2023-05-23 NOTE — IMPRESSION/PLAN
Impression: WET AMD OD H35.7096. Plan: RECUR HMG NEW Again in '23 --  NEW sliver fluid RECUR MAY '23 on SCAR
   RESUME 3/3 injx of VABYSMO preferred. TODAY no approvals. TODAY inj EYLEA OD (proc note). .   .  Future ND? RTC 4w [place] and plan HRA / plan VABYSMO #2/3 inj (get BI) Consider CARATENOIDS v. AREDS2 (D/w'd pt in detail / white board). . . AFTER 3/3 injx OD will re-eval w [place] --  FAILED prior injx Avastin Adelberg -- now EFFECTIVELY MONOCULAR w RECUR HMG and DROP VA . .. MUST use VABYSMO injx to use for retinal edema /dz activity. All r/b/a rvw'd accepted by pt. On-label FDA-apprv'd use of drug, yet remains low (but not zero) risk adverse ocular event - Understood. Costs / expense / insurance / Collette Whittington understood -accepted.

## 2023-06-20 ENCOUNTER — OFFICE VISIT (OUTPATIENT)
Dept: URBAN - METROPOLITAN AREA CLINIC 24 | Facility: CLINIC | Age: 83
End: 2023-06-20
Payer: MEDICARE

## 2023-06-20 DIAGNOSIS — H35.3211 EXUDATIVE AGE-RELATED MACULAR DEGENERATION, RIGHT EYE, WITH ACTIVE CHOROIDAL NEOVASCULARIZATION: Primary | ICD-10-CM

## 2023-06-20 PROCEDURE — 67028 INJECTION EYE DRUG: CPT | Performed by: OPHTHALMOLOGY

## 2023-06-20 PROCEDURE — 92242 FLUORESCEIN&ICG ANGIOGRAPHY: CPT | Performed by: OPHTHALMOLOGY

## 2023-06-20 PROCEDURE — 99214 OFFICE O/P EST MOD 30 MIN: CPT | Performed by: OPHTHALMOLOGY

## 2023-06-20 PROCEDURE — 92134 CPTRZ OPH DX IMG PST SGM RTA: CPT | Performed by: OPHTHALMOLOGY

## 2023-06-20 ASSESSMENT — INTRAOCULAR PRESSURE
OS: 14
OD: 12

## 2023-06-20 NOTE — IMPRESSION/PLAN
Impression: Exudative age-related macular degeneration, right eye, with active choroidal neovascularization: H35.8341. Plan: Active CNVM OD - fluid resolving, continue [id number] Vabysmo OD Vabysmo 1/3 OD administered today without complication. R/B/A discussed at length.  Follow-up in 4 weeks for Vabysmo 2/3 OD

## 2023-08-29 ENCOUNTER — OFFICE VISIT (OUTPATIENT)
Dept: URBAN - METROPOLITAN AREA CLINIC 24 | Facility: CLINIC | Age: 83
End: 2023-08-29
Payer: MEDICARE

## 2023-08-29 DIAGNOSIS — H35.3211 EXUDATIVE AGE-RELATED MACULAR DEGENERATION, RIGHT EYE, WITH ACTIVE CHOROIDAL NEOVASCULARIZATION: Primary | ICD-10-CM

## 2023-08-29 PROCEDURE — 67028 INJECTION EYE DRUG: CPT | Performed by: OPHTHALMOLOGY

## 2023-08-29 PROCEDURE — 92134 CPTRZ OPH DX IMG PST SGM RTA: CPT | Performed by: OPHTHALMOLOGY

## 2023-08-29 ASSESSMENT — INTRAOCULAR PRESSURE
OD: 15
OS: 14

## 2023-10-10 ENCOUNTER — OFFICE VISIT (OUTPATIENT)
Dept: URBAN - METROPOLITAN AREA CLINIC 24 | Facility: CLINIC | Age: 83
End: 2023-10-10
Payer: MEDICARE

## 2023-10-10 DIAGNOSIS — H35.3211 EXUDATIVE AGE-RELATED MACULAR DEGENERATION, RIGHT EYE, WITH ACTIVE CHOROIDAL NEOVASCULARIZATION: Primary | ICD-10-CM

## 2023-10-10 PROCEDURE — 99214 OFFICE O/P EST MOD 30 MIN: CPT | Performed by: OPHTHALMOLOGY

## 2023-10-10 PROCEDURE — 92242 FLUORESCEIN&ICG ANGIOGRAPHY: CPT | Performed by: OPHTHALMOLOGY

## 2023-10-10 PROCEDURE — 92134 CPTRZ OPH DX IMG PST SGM RTA: CPT | Performed by: OPHTHALMOLOGY

## 2023-10-10 PROCEDURE — 67028 INJECTION EYE DRUG: CPT | Performed by: OPHTHALMOLOGY

## 2023-10-10 ASSESSMENT — INTRAOCULAR PRESSURE
OD: 14
OS: 17

## 2023-11-27 ENCOUNTER — OFFICE VISIT (OUTPATIENT)
Dept: URBAN - METROPOLITAN AREA CLINIC 24 | Facility: CLINIC | Age: 83
End: 2023-11-27
Payer: MEDICARE

## 2023-11-27 DIAGNOSIS — H35.3211 EXUDATIVE AGE-RELATED MACULAR DEGENERATION, RIGHT EYE, WITH ACTIVE CHOROIDAL NEOVASCULARIZATION: Primary | ICD-10-CM

## 2023-11-27 PROCEDURE — 67028 INJECTION EYE DRUG: CPT | Performed by: OPHTHALMOLOGY

## 2023-11-27 PROCEDURE — 92134 CPTRZ OPH DX IMG PST SGM RTA: CPT | Performed by: OPHTHALMOLOGY

## 2023-11-27 ASSESSMENT — INTRAOCULAR PRESSURE
OD: 12
OS: 12

## 2024-01-16 ENCOUNTER — OFFICE VISIT (OUTPATIENT)
Dept: URBAN - METROPOLITAN AREA CLINIC 24 | Facility: CLINIC | Age: 84
End: 2024-01-16
Payer: MEDICARE

## 2024-01-16 PROCEDURE — 67028 INJECTION EYE DRUG: CPT | Performed by: OPHTHALMOLOGY

## 2024-01-16 PROCEDURE — 92134 CPTRZ OPH DX IMG PST SGM RTA: CPT | Performed by: OPHTHALMOLOGY

## 2024-01-16 ASSESSMENT — INTRAOCULAR PRESSURE
OD: 10
OS: 12

## 2024-03-04 ENCOUNTER — OFFICE VISIT (OUTPATIENT)
Dept: URBAN - METROPOLITAN AREA CLINIC 24 | Facility: CLINIC | Age: 84
End: 2024-03-04
Payer: MEDICARE

## 2024-03-04 DIAGNOSIS — H35.3211 EXUDATIVE AGE-RELATED MACULAR DEGENERATION, RIGHT EYE, WITH ACTIVE CHOROIDAL NEOVASCULARIZATION: Primary | ICD-10-CM

## 2024-03-04 PROCEDURE — 67028 INJECTION EYE DRUG: CPT | Performed by: OPHTHALMOLOGY

## 2024-03-04 PROCEDURE — 99214 OFFICE O/P EST MOD 30 MIN: CPT | Performed by: OPHTHALMOLOGY

## 2024-03-04 PROCEDURE — 92134 CPTRZ OPH DX IMG PST SGM RTA: CPT | Performed by: OPHTHALMOLOGY

## 2024-03-04 ASSESSMENT — INTRAOCULAR PRESSURE
OD: 16
OS: 26

## 2024-05-13 ENCOUNTER — OFFICE VISIT (OUTPATIENT)
Dept: URBAN - METROPOLITAN AREA CLINIC 24 | Facility: CLINIC | Age: 84
End: 2024-05-13
Payer: MEDICARE

## 2024-05-13 DIAGNOSIS — H35.3211 EXUDATIVE AGE-RELATED MACULAR DEGENERATION, RIGHT EYE, WITH ACTIVE CHOROIDAL NEOVASCULARIZATION: Primary | ICD-10-CM

## 2024-05-13 PROCEDURE — 67028 INJECTION EYE DRUG: CPT | Performed by: OPHTHALMOLOGY

## 2024-05-13 PROCEDURE — 92134 CPTRZ OPH DX IMG PST SGM RTA: CPT | Performed by: OPHTHALMOLOGY

## 2024-05-13 ASSESSMENT — INTRAOCULAR PRESSURE
OS: 12
OD: 13

## 2024-08-05 ENCOUNTER — OFFICE VISIT (OUTPATIENT)
Dept: URBAN - METROPOLITAN AREA CLINIC 24 | Facility: CLINIC | Age: 84
End: 2024-08-05
Payer: MEDICARE

## 2024-08-05 DIAGNOSIS — H35.3211 EXUDATIVE AGE-RELATED MACULAR DEGENERATION, RIGHT EYE, WITH ACTIVE CHOROIDAL NEOVASCULARIZATION: Primary | ICD-10-CM

## 2024-08-05 PROCEDURE — 67028 INJECTION EYE DRUG: CPT | Performed by: OPHTHALMOLOGY

## 2024-08-05 PROCEDURE — 92134 CPTRZ OPH DX IMG PST SGM RTA: CPT | Performed by: OPHTHALMOLOGY

## 2024-08-05 ASSESSMENT — INTRAOCULAR PRESSURE
OS: 12
OD: 10

## 2024-10-14 ENCOUNTER — OFFICE VISIT (OUTPATIENT)
Dept: URBAN - METROPOLITAN AREA CLINIC 24 | Facility: CLINIC | Age: 84
End: 2024-10-14
Payer: MEDICARE

## 2024-10-14 DIAGNOSIS — H35.3211 EXUDATIVE AGE-RELATED MACULAR DEGENERATION, RIGHT EYE, WITH ACTIVE CHOROIDAL NEOVASCULARIZATION: Primary | ICD-10-CM

## 2024-10-14 PROCEDURE — 92134 CPTRZ OPH DX IMG PST SGM RTA: CPT | Performed by: OPHTHALMOLOGY

## 2024-10-14 PROCEDURE — 92242 FLUORESCEIN&ICG ANGIOGRAPHY: CPT | Performed by: OPHTHALMOLOGY

## 2024-10-14 PROCEDURE — 67028 INJECTION EYE DRUG: CPT | Performed by: OPHTHALMOLOGY

## 2024-10-14 PROCEDURE — 99214 OFFICE O/P EST MOD 30 MIN: CPT | Performed by: OPHTHALMOLOGY

## 2024-10-14 ASSESSMENT — INTRAOCULAR PRESSURE
OS: 13
OD: 17

## 2025-01-20 ENCOUNTER — OFFICE VISIT (OUTPATIENT)
Dept: URBAN - METROPOLITAN AREA CLINIC 24 | Facility: CLINIC | Age: 85
End: 2025-01-20
Payer: MEDICARE

## 2025-01-20 DIAGNOSIS — H35.3211 EXUDATIVE AGE-RELATED MACULAR DEGENERATION, RIGHT EYE, WITH ACTIVE CHOROIDAL NEOVASCULARIZATION: Primary | ICD-10-CM

## 2025-01-20 PROCEDURE — 67028 INJECTION EYE DRUG: CPT | Performed by: OPHTHALMOLOGY

## 2025-01-20 PROCEDURE — 92134 CPTRZ OPH DX IMG PST SGM RTA: CPT | Performed by: OPHTHALMOLOGY

## 2025-01-20 ASSESSMENT — INTRAOCULAR PRESSURE
OS: 10
OD: 12

## 2025-05-06 ENCOUNTER — OFFICE VISIT (OUTPATIENT)
Dept: URBAN - METROPOLITAN AREA CLINIC 24 | Facility: CLINIC | Age: 85
End: 2025-05-06
Payer: MEDICARE

## 2025-05-06 DIAGNOSIS — H35.3211 EXUDATIVE AGE-RELATED MACULAR DEGENERATION, RIGHT EYE, WITH ACTIVE CHOROIDAL NEOVASCULARIZATION: Primary | ICD-10-CM

## 2025-05-06 PROCEDURE — 92134 CPTRZ OPH DX IMG PST SGM RTA: CPT | Performed by: OPHTHALMOLOGY

## 2025-05-06 PROCEDURE — 67028 INJECTION EYE DRUG: CPT | Performed by: OPHTHALMOLOGY

## 2025-05-06 ASSESSMENT — INTRAOCULAR PRESSURE
OD: 12
OS: 14

## 2025-07-01 ENCOUNTER — OFFICE VISIT (OUTPATIENT)
Dept: URBAN - METROPOLITAN AREA CLINIC 24 | Facility: CLINIC | Age: 85
End: 2025-07-01
Payer: MEDICARE

## 2025-07-01 DIAGNOSIS — H35.3211 EXUDATIVE AGE-RELATED MACULAR DEGENERATION, RIGHT EYE, WITH ACTIVE CHOROIDAL NEOVASCULARIZATION: Primary | ICD-10-CM

## 2025-07-01 PROCEDURE — 99214 OFFICE O/P EST MOD 30 MIN: CPT | Performed by: OPHTHALMOLOGY

## 2025-07-01 PROCEDURE — 92134 CPTRZ OPH DX IMG PST SGM RTA: CPT | Performed by: OPHTHALMOLOGY

## 2025-07-01 PROCEDURE — 67028 INJECTION EYE DRUG: CPT | Performed by: OPHTHALMOLOGY

## 2025-07-01 ASSESSMENT — INTRAOCULAR PRESSURE
OD: 5
OS: 8